# Patient Record
(demographics unavailable — no encounter records)

---

## 2024-10-24 NOTE — PHYSICAL EXAM
[Alert] : alert [Healthy Appearing] : healthy appearing [No Acute Distress] : no acute distress [Underweight (BMI <= 18.5)] : underweight (BMI <= 18.5) [Abdomen Tenderness] : non-tender [No Masses] : no abdominal mass palpated [Abdomen Soft] : soft [No Focal Deficits] : no focal deficits [Oriented To Time, Place, And Person] : oriented to person, place, and time [Normal Affect] : the affect was normal

## 2024-10-30 NOTE — HISTORY OF PRESENT ILLNESS
[FreeTextEntry1] : weight check.  [de-identified] : 24 y/o male presenting for f/u abd bloating and indigestion. Pt reports continued abd bloating and epigastric pain following eating/drinking. Pt also states for past 2 days his throat has felt dry but denies sore throat, difficulty swallowing, nasal congestion, fever or recent sick contacts. Pt recently saw GI who recommended EGD on 11/6 as well as d/c PPI until after testing. Pt denies change in bowel habits. Pt continues to endorse vegan diet. Pt has gained 2lbs since last visit. Would like to start B12 injections.

## 2024-10-30 NOTE — HISTORY OF PRESENT ILLNESS
[FreeTextEntry1] : weight check.  [de-identified] : 24 y/o male presenting for f/u abd bloating and indigestion. Pt reports continued abd bloating and epigastric pain following eating/drinking. Pt also states for past 2 days his throat has felt dry but denies sore throat, difficulty swallowing, nasal congestion, fever or recent sick contacts. Pt recently saw GI who recommended EGD on 11/6 as well as d/c PPI until after testing. Pt denies change in bowel habits. Pt continues to endorse vegan diet. Pt has gained 2lbs since last visit. Would like to start B12 injections.

## 2024-10-31 NOTE — ASSESSMENT
[FreeTextEntry1] : 22yo M who moved [place] ago ([place]) w/ PMHx GERD with HH and LA grade A esophagitis seen on EGD completed in [place] 8/2022. Now presents for abdominal bloating after referral from PCP who started patient on omeprazole qD x1 mo which he has been taking up until 1 mo ago.  #GERD with LA Grade A Esophagitis on EGD 8/2022 (pt does not have any biopsy results in outside records, unclear if taken when had EGD evaluation in [place]) #Small Hiatal Hernia, Hill Grade I #Postprandial Dyspepsia Symptom constellation (Abdo bloating, heart burn, belching, mild pain intermittently, nausea without vomiting) *HP stool antigen, O&P, and stool culture negative 8/30/2024 #Underweight BMI 14 - patient without acute weight loss, reports same clothes fit the same since 2+ years ago. #B12 Deficiency could be due to pernicious anemia with autoimmune gastritis, parasite? ileal disease (astmptomatic)  DDx: GERD iso HH vs PUD vs H pylori infection vs Celiac dz (without anemia on most recent lab work),  pernicious anemia with autoimmune gastritis, parasite? ileal disease (astmptomatic)  Recommendations: -plan for EGD with biopsies -NPO at midnight night before EGD -hold antacid for now until EGD completed -c/w B12 supplementation - serum gastrin level -RTC 3mo or sooner pending EGD results  Chang Butcher MD PGY-6 GI/Hepatology Fellow Case discussed with attending
[FreeTextEntry1] : 22yo M who [place] ago ([place]) w/ PMHx GERD with HH and LA grade A esophagitis seen on EGD completed in [place] 8/2022. Now presents for abdominal bloating after referral from PCP who started patient on omeprazole qD x1 mo which he has been taking up until 1 mo ago.  #GERD with LA Grade A Esophagitis on EGD 8/2022 (pt does not have any biopsy results in outside records, unclear if taken when had EGD evaluation in [place]) #Small Hiatal Hernia, Hill Grade I #Postprandial Dyspepsia Symptom constellation (Abdo bloating, heart burn, belching, mild pain intermittently, nausea without vomiting) *HP stool antigen, O&P, and stool culture negative 8/30/2024 #Underweight BMI 14 - patient without acute weight loss, reports same clothes fit the same since 2+ years ago. #B12 Deficiency could be due to pernicious anemia with autoimmune gastritis, parasite? ileal disease (astmptomatic)  DDx: GERD iso HH vs PUD vs H pylori infection vs Celiac dz (without anemia on most recent lab work),  pernicious anemia with autoimmune gastritis, parasite? ileal disease (astmptomatic)  Recommendations: -plan for EGD with biopsies -NPO at midnight night before EGD -hold antacid for now until EGD completed -c/w B12 supplementation - serum gastrin level -RTC 3mo or sooner pending EGD results  Chang Butcher MD PGY-6 GI/Hepatology Fellow Case discussed with attending
no

## 2024-10-31 NOTE — END OF VISIT
[] : Fellow [FreeTextEntry3] : As modified and discussed with patient MD ANDI Lyles [facility] Associate Professor of Medicine [facility]   [Time Spent: ___ minutes] : I have spent [unfilled] minutes of time on the encounter which excludes teaching and separately reported services.

## 2024-10-31 NOTE — END OF VISIT
[] : Fellow [FreeTextEntry3] : As modified and discussed with patient MD ANDI Lyles [facility] Associate Professor of Medicine [facility]   [Time Spent: ___ minutes] : I have spent [unfilled] minutes of time on the encounter which excludes teaching and separately reported services.

## 2024-10-31 NOTE — HISTORY OF PRESENT ILLNESS
[FreeTextEntry1] : 22yo M who [place] from [place] to [place] 2 years ago ([facility]) w/ PMHx GERD with LA grade A esophagitis seen on EGD completed in [place] 8/2022. He has the endoscopy report from [place] with pictures which was reviewed.  Now, he presents for abdominal bloating after referral from PCP who started patient on omeprazole qD x1 mo which he has been taking up until 1 mo ago. He was compliant during that time and taking medication correctly. Patient reports a few years ago when living in [place] had issue where whenever he would eat he would have vomiting episodes and was started on pantoprazole which he took for a month. Patient feels nausea every time he eats. He does not vomit. He also reports early satiety, abdominal bloating, burping. Patient reports BM habit has changed recently with incr number of BM's from 1/day to 2-3/day. No blood or black stool. Stools are formed. Denies weight loss, nighttime symptoms, dysphagia, odynophagia. EGD completed 8/2022 in [facility] with Grade A Esophagitis. Patient has never had a colonoscopy. No FHx of GI cancers in family. No gallstones. He denies any current medication use (was taking omeprazole q AM up until 1 wk ago). Pt reports omeprazole only mildly helped symptoms. Never drinker. No cigarettes. No IVDU. He denies recent travel, sick contacts, new foods. 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 He denies work related stress. [de-identified] : 8/2022 in [place] - Grade A Esophagitis. Hill Grade 1 EGJ. Small HH. Unclear if biopsies taken.

## 2024-10-31 NOTE — HISTORY OF PRESENT ILLNESS
[FreeTextEntry1] : 22yo M who moved from [place] 2 years ago ([facility]) w/ PMHx GERD with LA grade A esophagitis seen on EGD completed in [place] 8/2022. He has the endoscopy report from [place] with pictures which was reviewed.  Now, he presents for abdominal bloating after referral from PCP who started patient on omeprazole qD x1 mo which he has been taking up until 1 mo ago. He was compliant during that time and taking medication correctly. Patient reports a few years ago when living in [place] had issue where whenever he would eat he would have vomiting episodes and was started on pantoprazole which he took for a month. Patient feels nausea every time he eats. He does not vomit. He also reports early satiety, abdominal bloating, burping. Patient reports BM habit has changed recently with incr number of BM's from 1/day to 2-3/day. No blood or black stool. Stools are formed. Denies weight loss, nighttime symptoms, dysphagia, odynophagia. EGD completed 8/2022 in [facility] with Grade A Esophagitis. Patient has never had a colonoscopy. No FHx of GI cancers in family. No gallstones. He denies any current medication use (was taking omeprazole q AM up until 1 wk ago). Pt reports omeprazole only mildly helped symptoms. Never drinker. No cigarettes. No IVDU. He denies recent travel, sick contacts, new foods. 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 He denies work related stress. [de-identified] : 8/2022 in [place] - Grade A Esophagitis. Hill Grade 1 EGJ. Small HH. Unclear if biopsies taken.

## 2024-11-07 NOTE — HISTORY OF PRESENT ILLNESS
[FreeTextEntry1] : B12 injection [de-identified] : 23 year-old male with vitamin B 12 deficiency who is currently on vegan diet presenting today for a B12 injection. As per previous note, the plan is to give weekly B12 100 mg injection and then after four doses transition to monthly B12 injections. Patient has a very low BMI of 14. Normal TSH. Reports no signs of neuropathy.

## 2024-11-07 NOTE — END OF VISIT
[] : Resident [FreeTextEntry3] : patient has BMI of 14, no issues with food, he is vegetarian but doesn't eat eggs. I advised him about his low BMI, condition related to it in the future and need to eat more, he said that he feels fullness soon after eating, has seen by GI also. we discussed adding dairy product in the food and adding more carb/protein food such as beans. he said he will try to do it.

## 2024-11-07 NOTE — HISTORY OF PRESENT ILLNESS
[FreeTextEntry1] : B12 injection [de-identified] : 23 year-old male with vitamin B 12 deficiency who is currently on vegan diet presenting today for a B12 injection. As per previous note, the plan is to give weekly B12 100 mg injection and then after four doses transition to monthly B12 injections. Patient has a very low BMI of 14. Normal TSH. Reports no signs of neuropathy.

## 2024-11-07 NOTE — PLAN
[FreeTextEntry1] : Lot # [id number] Exp Date: 12/24  RTC 1 week for repeat B12 injection  Case d/w Dr. Damon

## 2024-11-07 NOTE — PLAN
[FreeTextEntry1] : Lot # [id number] Exp Date: 12/24  RTC 1 week for repeat B12 injection  Case d/w Dr. Damon

## 2024-11-07 NOTE — PLAN
[FreeTextEntry1] : Lot # [id number] Exp Date: 12/24  RTC 1 week for repeat B12 injection  Case d/w Dr. Damon

## 2024-11-07 NOTE — HISTORY OF PRESENT ILLNESS
[FreeTextEntry1] : B12 injection [de-identified] : 23 year-old male with vitamin B 12 deficiency who is currently on vegan diet presenting today for a B12 injection. As per previous note, the plan is to give weekly B12 100 mg injection and then after four doses transition to monthly B12 injections. Patient has a very low BMI of 14. Normal TSH. Reports no signs of neuropathy.

## 2024-11-18 NOTE — HISTORY OF PRESENT ILLNESS
[FreeTextEntry1] : b12 shot [de-identified] : Pt is a 23-year-old male with vitamin B 12 deficiency who is currently on vegan diet presenting today for a B12 injection. Pt BMI last visit was 14. Notes that he is currently trying to supplement diet with daily protein shakes. Also increasing intake of healthy fats including nuts.

## 2024-11-18 NOTE — REVIEW OF SYSTEMS
[Fever] : no fever [Chills] : no chills [Fatigue] : no fatigue [Chest Pain] : no chest pain [Palpitations] : no palpitations [Shortness Of Breath] : no shortness of breath [Wheezing] : no wheezing [Abdominal Pain] : no abdominal pain [Cough] : no cough [Nausea] : no nausea [Constipation] : no constipation [Diarrhea] : no diarrhea [Vomiting] : no vomiting [Joint Pain] : no joint pain [Back Pain] : no back pain [Anxiety] : no anxiety [Depression] : no depression

## 2024-11-18 NOTE — REVIEW OF SYSTEMS
[Fever] : no fever [Chills] : no chills [Fatigue] : no fatigue [Chest Pain] : no chest pain [Palpitations] : no palpitations [Shortness Of Breath] : no shortness of breath [Wheezing] : no wheezing [Cough] : no cough [Abdominal Pain] : no abdominal pain [Nausea] : no nausea [Constipation] : no constipation [Diarrhea] : no diarrhea [Vomiting] : no vomiting [Joint Pain] : no joint pain [Back Pain] : no back pain [Anxiety] : no anxiety [Depression] : no depression

## 2024-11-18 NOTE — HISTORY OF PRESENT ILLNESS
[FreeTextEntry1] : b12 shot [de-identified] : Pt is a 23-year-old male with vitamin B 12 deficiency who is currently on vegan diet presenting today for a B12 injection. Pt BMI last visit was 14. Notes that he is currently trying to supplement diet with daily protein shakes. Also increasing intake of healthy fats including nuts.

## 2024-11-18 NOTE — PHYSICAL EXAM
[No Acute Distress] : no acute distress [Well Nourished] : well nourished [Well Developed] : well developed [Well-Appearing] : well-appearing [No Respiratory Distress] : no respiratory distress  [No Accessory Muscle Use] : no accessory muscle use [Clear to Auscultation] : lungs were clear to auscultation bilaterally [Normal Rate] : normal rate  [Regular Rhythm] : with a regular rhythm [Normal S1, S2] : normal S1 and S2 [Soft] : abdomen soft [Non Tender] : non-tender [Non-distended] : non-distended [Normal Bowel Sounds] : normal bowel sounds [Normal Affect] : the affect was normal [Normal Insight/Judgement] : insight and judgment were intact [de-identified] : long and lean body habitus

## 2024-11-18 NOTE — PHYSICAL EXAM
[No Acute Distress] : no acute distress [Well Nourished] : well nourished [Well Developed] : well developed [Well-Appearing] : well-appearing [No Respiratory Distress] : no respiratory distress  [No Accessory Muscle Use] : no accessory muscle use [Clear to Auscultation] : lungs were clear to auscultation bilaterally [Normal Rate] : normal rate  [Regular Rhythm] : with a regular rhythm [Normal S1, S2] : normal S1 and S2 [Soft] : abdomen soft [Non Tender] : non-tender [Non-distended] : non-distended [Normal Bowel Sounds] : normal bowel sounds [Normal Affect] : the affect was normal [Normal Insight/Judgement] : insight and judgment were intact [de-identified] : long and lean body habitus